# Patient Record
Sex: MALE | Race: WHITE | Employment: OTHER | URBAN - NONMETROPOLITAN AREA
[De-identification: names, ages, dates, MRNs, and addresses within clinical notes are randomized per-mention and may not be internally consistent; named-entity substitution may affect disease eponyms.]

---

## 2024-10-25 ENCOUNTER — OFFICE VISIT (OUTPATIENT)
Dept: UROLOGY | Age: 74
End: 2024-10-25

## 2024-10-25 VITALS
HEIGHT: 70 IN | BODY MASS INDEX: 25.41 KG/M2 | OXYGEN SATURATION: 97 % | HEART RATE: 68 BPM | RESPIRATION RATE: 18 BRPM | WEIGHT: 177.5 LBS

## 2024-10-25 DIAGNOSIS — N13.8 BPH WITH OBSTRUCTION/LOWER URINARY TRACT SYMPTOMS: ICD-10-CM

## 2024-10-25 DIAGNOSIS — R30.0 DYSURIA: Primary | ICD-10-CM

## 2024-10-25 DIAGNOSIS — N40.1 BPH WITH OBSTRUCTION/LOWER URINARY TRACT SYMPTOMS: ICD-10-CM

## 2024-10-25 LAB
BILIRUBIN, URINE: ABNORMAL
BLOOD URINE, POC: ABNORMAL
CHARACTER, URINE: CLEAR
COLOR, UA: YELLOW
GLUCOSE URINE: NEGATIVE MG/DL
KETONES, URINE: 40
LEUKOCYTE CLUMPS, URINE: ABNORMAL
NITRITE, URINE: NEGATIVE
PH, URINE: 6 (ref 5–9)
POST VOID RESIDUAL (PVR): 148 ML
PROTEIN, URINE: 100 MG/DL
SPECIFIC GRAVITY UA: 1.02 (ref 1–1.03)
UROBILINOGEN, URINE: 2 EU/DL (ref 0–1)

## 2024-10-25 RX ORDER — GABAPENTIN 300 MG/1
CAPSULE ORAL
COMMUNITY
Start: 2024-10-16

## 2024-10-25 RX ORDER — HYDROCODONE BITARTRATE AND ACETAMINOPHEN 5; 325 MG/1; MG/1
TABLET ORAL
COMMUNITY

## 2024-10-25 RX ORDER — TAMSULOSIN HYDROCHLORIDE 0.4 MG/1
0.4 CAPSULE ORAL DAILY
COMMUNITY
Start: 2024-01-11

## 2024-10-25 RX ORDER — POLYETHYLENE GLYCOL 3350 17 G/17G
POWDER, FOR SOLUTION ORAL
COMMUNITY

## 2024-10-25 ASSESSMENT — ENCOUNTER SYMPTOMS
SHORTNESS OF BREATH: 0
ABDOMINAL PAIN: 0
COUGH: 0

## 2024-10-25 NOTE — PROGRESS NOTES
[facility] [place] [facility]  [facility] UROLOGY  [address]  [address]  [facility] [id number]  Dept: [phone]  Loc: [phone]    Visit Date: 10/25/2024        HPI:   Tremayne is a 74 year old [place] seen typically by Shakila at [facility].  Was seen today due to LUTS.    Cysto 10/16/24 by Dr Sutton.  Large prostate per pt.  Greenlight scheduled 11/18/24.   On antibiotic since yesterday for 7 days.  Sent by another provider.  Pt unable to tell me name.     Seen today 10/25/24. .  UTI suspected. past 3 to 4 days symptoms worsened.  Feels antibiotic is helping that started yesterday. .  +urgency, frequency, dysuria. No hematuria. Had headaches and chills. No fever. Chills improved.   IPSS elevated in 20's.   Off flomax.  Off diropan.  Flomax Never helped. Poor appettie past few days.  PVR 148ml.   UA mod blood, large luek, neg nitrites.  Pt has weak flow with intermittency but is not just dribbling.  No abdominal pain or flank pain.       Current Outpatient Medications   Medication Sig Dispense Refill    HYDROcodone-acetaminophen (NORCO) 5-325 MG per tablet TAKE 1 TABLET BY MOUTH EVERY 6 HOURS FOR 5 DAYS      polyethylene glycol (MIRALAX) 17 g packet MiraLax      gabapentin (NEURONTIN) 300 MG capsule 1 cap(s) orally AT NIGHT for 30 days      tamsulosin (FLOMAX) 0.4 MG capsule Take 1 capsule by mouth daily       No current facility-administered medications for this visit.       Past Medical History  Tremayne  has a past medical history of UTI (urinary tract infection).    Past Surgical History  The patient  has a past surgical history that includes Cystoscopy; Colonoscopy; hernia repair; Leg Muscle Surgery (Bilateral); and joint replacement.    Family History  This patient's family history is not on file.    Social History  Tremayne  reports that he has never smoked. He has never used smokeless tobacco.      Subjective:      Review of Systems   Constitutional:  Negative for chills and fever.

## 2024-10-25 NOTE — PATIENT INSTRUCTIONS
Continue antibiotic    If fevers, chills, uncontrolled pain or unable to urinate/only dribbling need to go to ER.     Call us with name of antibiotic u are taking.

## 2024-10-27 LAB — BACTERIA UR CULT: NORMAL
